# Patient Record
Sex: FEMALE | Race: WHITE | Employment: FULL TIME
[De-identification: names, ages, dates, MRNs, and addresses within clinical notes are randomized per-mention and may not be internally consistent; named-entity substitution may affect disease eponyms.]

---

## 2021-12-05 ENCOUNTER — NURSE TRIAGE (OUTPATIENT)
Dept: OTHER | Facility: CLINIC | Age: 26
End: 2021-12-05

## 2021-12-06 NOTE — TELEPHONE ENCOUNTER
Brief description of triage: Patient is calling because she developed abdominal pain on Thursday and the pain is 8/10. Patient has had nausea, vomiting, diarrhea and fever. Triage indicates for patient to go to ED    Care advice provided, patient verbalizes understanding; denies any other questions or concerns; instructed to call back for any new or worsening symptoms. This triage is a result of a call to [address] [place] [address]. Please do not respond to the triage nurse through this encounter. Any subsequent communication should be directly with the patient. Reason for Disposition   Pain is mainly in upper abdomen  (if needed ask: \"is it mainly above the belly button? \")   [1] SEVERE pain (e.g., excruciating) AND [2] present > 1 hour    Answer Assessment - Initial Assessment Questions  1. LOCATION: \"Where does it hurt? \"       Upper left abdomen. 2. RADIATION: \"Does the pain shoot anywhere else? \" (e.g., chest, back)      Radiates to middle of abdomen. 3. ONSET: \"When did the pain begin? \" (e.g., minutes, hours or days ago)       Thursday     4. SUDDEN: \"Gradual or sudden onset? \"      Gradual     5. PATTERN \"Does the pain come and go, or is it constant? \"     - If constant: \"Is it getting better, staying the same, or worsening? \"       (Note: Constant means the pain never goes away completely; most serious pain is constant and it progresses)      - If intermittent: \"How long does it last?\" \"Do you have pain now? \"      (Note: Intermittent means the pain goes away completely between bouts)      Comes and goes. 6. SEVERITY: \"How bad is the pain? \"  (e.g., Scale 1-10; mild, moderate, or severe)    - MILD (1-3): doesn't interfere with normal activities, abdomen soft and not tender to touch     - MODERATE (4-7): interferes with normal activities or awakens from sleep, tender to touch     - SEVERE (8-10): excruciating pain, doubled over, unable to do any normal activities       Current pain level is 9/10.     7. RECURRENT SYMPTOM: \"Have you ever had this type of abdominal pain before? \" If so, ask: \"When was the last time? \" and \"What happened that time? \"       Never had pain like this before. 8. CAUSE: \"What do you think is causing the abdominal pain? \"      Unsure of cause. 9. RELIEVING/AGGRAVATING FACTORS: \"What makes it better or worse? \" (e.g., movement, antacids, bowel movement)      Putting pressure makes pain worse. 10. OTHER SYMPTOMS: \"Has there been any vomiting, diarrhea, constipation, or urine problems? \"        Nausea, vomiting, fever and diarrhea.     Protocols used: ABDOMINAL PAIN - FEMALE-ADULT-, ABDOMINAL PAIN - UPPER-ADULT-AH

## 2022-03-12 ENCOUNTER — NURSE TRIAGE (OUTPATIENT)
Dept: OTHER | Facility: CLINIC | Age: 27
End: 2022-03-12

## 2022-03-12 NOTE — TELEPHONE ENCOUNTER
Subjective: Caller states \"Should I go to the ER?\"     Current Symptoms: Pt reports left upper abdominal pain and diarrhea. Eating and drinking makes the pain worse. Belching and laying in the fetal position makes the pain better. Pt states that she has had an episode of watery/yellow stool about every 30 minutes since last night. She did have pain like this in December 2021. Pain did go away on it's own. She does have appointment with a provider on Monday 3/14/2022. She is not sure if it is okay to wait until then to be seen. Onset: Last night    Associated Symptoms: reduced appetite, diarrhea    Pain Severity: 8-9 out of 10, off and on sharp pain, constant dull pain    Temperature: Denies fever    What has been tried: Tylenol     LMP: Last week Pregnant: No    Recommended disposition: Go to ED Now    Care advice provided, patient verbalizes understanding; denies any other questions or concerns; instructed to call back for any new or worsening symptoms. Patient/caller agrees to proceed to   Emergency Department    This triage is a result of a call to [address] [place] [address]. Please do not respond to the triage nurse through this encounter. Any subsequent communication should be directly with the patient.     Reason for Disposition   [1] SEVERE pain (e.g., excruciating) AND [2] present > 1 hour    Protocols used: ABDOMINAL PAIN - [facility]

## 2022-04-25 ENCOUNTER — TELEPHONE (OUTPATIENT)
Dept: SURGERY | Age: 27
End: 2022-04-25

## 2022-04-25 NOTE — TELEPHONE ENCOUNTER
LM for pt to call and schedule new patient appt for gallbladder consult. HIDA scan negative. Referral from Dr Alvina Carpenter to Dr Khai Kapoor, or [facility]. Please schedule new patient appt.

## 2022-04-29 ENCOUNTER — OFFICE VISIT (OUTPATIENT)
Dept: SURGERY | Age: 27
End: 2022-04-29
Payer: COMMERCIAL

## 2022-04-29 ENCOUNTER — TELEPHONE (OUTPATIENT)
Dept: SURGERY | Age: 27
End: 2022-04-29

## 2022-04-29 VITALS
RESPIRATION RATE: 18 BRPM | HEART RATE: 100 BPM | DIASTOLIC BLOOD PRESSURE: 70 MMHG | WEIGHT: 200.2 LBS | HEIGHT: 64 IN | OXYGEN SATURATION: 98 % | TEMPERATURE: 98.2 F | SYSTOLIC BLOOD PRESSURE: 116 MMHG | BODY MASS INDEX: 34.18 KG/M2

## 2022-04-29 DIAGNOSIS — K82.8 BILIARY DYSKINESIA: Primary | ICD-10-CM

## 2022-04-29 DIAGNOSIS — R10.11 RIGHT UPPER QUADRANT PAIN: ICD-10-CM

## 2022-04-29 PROCEDURE — G8417 CALC BMI ABV UP PARAM F/U: HCPCS | Performed by: SURGERY

## 2022-04-29 PROCEDURE — 1036F TOBACCO NON-USER: CPT | Performed by: SURGERY

## 2022-04-29 PROCEDURE — 99203 OFFICE O/P NEW LOW 30 MIN: CPT | Performed by: SURGERY

## 2022-04-29 PROCEDURE — G8427 DOCREV CUR MEDS BY ELIG CLIN: HCPCS | Performed by: SURGERY

## 2022-04-29 ASSESSMENT — ENCOUNTER SYMPTOMS
VOICE CHANGE: 0
PHOTOPHOBIA: 0
TROUBLE SWALLOWING: 0
COLOR CHANGE: 0
EYE REDNESS: 0
BLOOD IN STOOL: 0
CHOKING: 0
EYE ITCHING: 0
SHORTNESS OF BREATH: 0
CONSTIPATION: 0
WHEEZING: 0
VOMITING: 0
SINUS PRESSURE: 0
DIARRHEA: 0
CHEST TIGHTNESS: 0
EYE PAIN: 0
STRIDOR: 0
RHINORRHEA: 0
ANAL BLEEDING: 0
ABDOMINAL DISTENTION: 0
NAUSEA: 0
SINUS PAIN: 0
COUGH: 0
FACIAL SWELLING: 0
ABDOMINAL PAIN: 0
RECTAL PAIN: 0
APNEA: 0
EYE DISCHARGE: 0
BACK PAIN: 0
SORE THROAT: 0

## 2022-04-29 NOTE — PROGRESS NOTES
Subjective:      Patient ID: Fabiola Connolly is a 31 y.o. female. Chief Complaint   Patient presents with    Surgical Consult     New patient-referred by Dr Ramila Finney upper quadrant, nausea and vomiting     HPI  Pleasant 32 yo female who for the last 4 months has had severe right upper quadrant discomfort with associated nausea and vomiting. It is exacerbated by eating almost anything at this point. No alleviating factors. It is burning and does radiate to her back. She rates it as moderate to severe and interfering with daily life. History reviewed. No pertinent past medical history. History reviewed. No pertinent surgical history. Social History     Socioeconomic History    Marital status:      Spouse name: Not on file    Number of children: Not on file    Years of education: Not on file    Highest education level: Not on file   Occupational History    Not on file   Tobacco Use    Smoking status: Never Smoker    Smokeless tobacco: Never Used   Vaping Use    Vaping Use: Not on file   Substance and Sexual Activity    Alcohol use: Never    Drug use: Never    Sexual activity: Yes     Partners: Male   Other Topics Concern    Not on file   Social History Narrative    Not on file     Social Determinants of Health     Financial Resource Strain:     Difficulty of Paying Living Expenses: Not on file   Food Insecurity:     Worried About Running Out of Food in the Last Year: Not on file    Singh of Food in the Last Year: Not on file   Transportation Needs:     Lack of Transportation (Medical): Not on file    Lack of Transportation (Non-Medical):  Not on file   Physical Activity:     Days of Exercise per Week: Not on file    Minutes of Exercise per Session: Not on file   Stress:     Feeling of Stress : Not on file   Social Connections:     Frequency of Communication with Friends and Family: Not on file    Frequency of Social Gatherings with Friends and Family: Not on file    Attends Hinduism Services: Not on file    Active Member of Clubs or Organizations: Not on file    Attends Club or Organization Meetings: Not on file    Marital Status: Not on file   Intimate Partner Violence:     Fear of Current or Ex-Partner: Not on file    Emotionally Abused: Not on file    Physically Abused: Not on file    Sexually Abused: Not on file   Housing Stability:     Unable to Pay for Housing in the Last Year: Not on file    Number of [place] in the Last Year: Not on file    Unstable Housing in the Last Year: Not on file     Family History   Problem Relation Age of Onset    Fibromyalgia Mother     Diabetes Father     Liver Disease Father     Kidney Disease Paternal Grandfather        Review of Systems   Constitutional: Negative for activity change, appetite change, chills, diaphoresis, fatigue, fever and unexpected weight change. HENT: Negative for congestion, dental problem, drooling, ear discharge, ear pain, facial swelling, hearing loss, mouth sores, nosebleeds, postnasal drip, rhinorrhea, sinus pressure, sinus pain, sneezing, sore throat, tinnitus, trouble swallowing and voice change. Eyes: Negative for photophobia, pain, discharge, redness, itching and visual disturbance. Respiratory: Negative for apnea, cough, choking, chest tightness, shortness of breath, wheezing and stridor. Cardiovascular: Negative for chest pain, palpitations and leg swelling. Gastrointestinal: Negative for abdominal distention, abdominal pain, anal bleeding, blood in stool, constipation, diarrhea, nausea, rectal pain and vomiting. Endocrine: Negative for cold intolerance, heat intolerance, polydipsia, polyphagia and polyuria. Genitourinary: Negative for decreased urine volume, difficulty urinating, dyspareunia, dysuria, enuresis, flank pain, frequency, genital sores, hematuria, menstrual problem, pelvic pain, urgency, vaginal bleeding, vaginal discharge and vaginal pain.    Musculoskeletal: Negative for arthralgias, back pain, gait problem, joint swelling, myalgias, neck pain and neck stiffness. Skin: Negative for color change, pallor, rash and wound. Allergic/Immunologic: Negative for environmental allergies, food allergies and immunocompromised state. Neurological: Negative for dizziness, tremors, seizures, syncope, facial asymmetry, speech difficulty, weakness, light-headedness, numbness and headaches. Hematological: Negative for adenopathy. Does not bruise/bleed easily. Psychiatric/Behavioral: Negative for agitation, behavioral problems, confusion, decreased concentration, dysphoric mood, hallucinations, self-injury, sleep disturbance and suicidal ideas. The patient is not nervous/anxious and is not hyperactive. /70 (Site: Right Upper Arm, Position: Sitting, Cuff Size: Medium Adult)   Pulse 100   Temp 98.2 °F (36.8 °C) (Temporal)   Resp 18   Ht 5' 4\" (1.626 m)   Wt 200 lb 3.2 oz (90.8 kg)   LMP 04/27/2022 (Exact Date)   SpO2 98%   BMI 34.36 kg/m²         IMAGING    Objective:   Physical Exam  Constitutional:       Appearance: Normal appearance. She is well-developed. She is not ill-appearing. HENT:      Head: Normocephalic and atraumatic. Mouth/Throat:      Mouth: Mucous membranes are moist.      Pharynx: No oropharyngeal exudate. Eyes:      General: No scleral icterus. Extraocular Movements: Extraocular movements intact. Pupils: Pupils are equal, round, and reactive to light. Neck:      Thyroid: No thyromegaly. Trachea: No tracheal deviation. Cardiovascular:      Rate and Rhythm: Normal rate. Pulses: Normal pulses. Pulmonary:      Effort: Pulmonary effort is normal. No respiratory distress. Abdominal:      Palpations: Abdomen is soft. Tenderness: There is no abdominal tenderness. There is no guarding. Hernia: No hernia is present. Musculoskeletal:         General: No deformity. Normal range of motion.       Cervical back: Normal range of motion and neck supple. Skin:     General: Skin is warm. Findings: No rash. Neurological:      General: No focal deficit present. Mental Status: She is alert and oriented to person, place, and time. Psychiatric:         Mood and Affect: Mood normal.         Speech: Speech normal.         Behavior: Behavior normal.         Thought Content: Thought content normal.         Assessment:         32 yo female with symptoms classic for biliary disease, she had a HIDA scan demonstrating EF greater than 85%. 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 I discussed the surgery, the standard recovery and the risks and benefits and possible complications.  She understands all of this and would like to undergo laparoscopic cholecystectomy       Electronically signed by Mary Jo Reyes MD on 5/9/2022 at 11:28 AM

## 2022-05-03 ENCOUNTER — TELEPHONE (OUTPATIENT)
Dept: SURGERY | Age: 27
End: 2022-05-03

## 2022-05-22 LAB
ALBUMIN SERPL-MCNC: 4.6 G/DL (ref 3.2–5.3)
ALK PHOSPHATASE: 76 U/L (ref 39–130)
ALT SERPL-CCNC: 22 U/L (ref 0–31)
AST SERPL-CCNC: 13 U/L (ref 0–41)
BILIRUB SERPL-MCNC: 0.3 MG/DL (ref 0.3–1.2)
BILIRUBIN DIRECT: 0 MG/DL (ref 0–0.4)
TOTAL PROTEIN: 7.7 G/DL (ref 6–8)

## 2022-05-24 ENCOUNTER — PREP FOR PROCEDURE (OUTPATIENT)
Dept: SURGERY | Age: 27
End: 2022-05-24

## 2022-05-24 RX ORDER — INDOCYANINE GREEN AND WATER 25 MG
2.5 KIT INJECTION ONCE
Status: CANCELLED | OUTPATIENT
Start: 2022-05-24

## 2022-05-24 RX ORDER — SODIUM CHLORIDE 9 MG/ML
INJECTION, SOLUTION INTRAVENOUS CONTINUOUS
Status: CANCELLED | OUTPATIENT
Start: 2022-05-24

## 2022-06-01 ENCOUNTER — ANESTHESIA (OUTPATIENT)
Dept: OPERATING ROOM | Age: 27
End: 2022-06-01
Payer: COMMERCIAL

## 2022-06-01 ENCOUNTER — ANESTHESIA EVENT (OUTPATIENT)
Dept: OPERATING ROOM | Age: 27
End: 2022-06-01
Payer: COMMERCIAL

## 2022-06-01 ENCOUNTER — HOSPITAL ENCOUNTER (OUTPATIENT)
Age: 27
Setting detail: OUTPATIENT SURGERY
Discharge: HOME | End: 2022-06-01
Attending: SURGERY | Admitting: SURGERY
Payer: COMMERCIAL

## 2022-06-01 VITALS
TEMPERATURE: 97.7 F | HEART RATE: 97 BPM | OXYGEN SATURATION: 95 % | HEIGHT: 64 IN | DIASTOLIC BLOOD PRESSURE: 65 MMHG | BODY MASS INDEX: 34.93 KG/M2 | WEIGHT: 204.6 LBS | SYSTOLIC BLOOD PRESSURE: 106 MMHG | RESPIRATION RATE: 16 BRPM

## 2022-06-01 DIAGNOSIS — R10.11 RIGHT UPPER QUADRANT ABDOMINAL PAIN: ICD-10-CM

## 2022-06-01 DIAGNOSIS — K82.8 BILIARY DYSKINESIA: ICD-10-CM

## 2022-06-01 LAB — PREGNANCY, URINE: NEGATIVE

## 2022-06-01 PROCEDURE — 7100000000 HC PACU RECOVERY - FIRST 15 MIN: Performed by: SURGERY

## 2022-06-01 PROCEDURE — 7100000001 HC PACU RECOVERY - ADDTL 15 MIN: Performed by: SURGERY

## 2022-06-01 PROCEDURE — 2720000010 HC SURG SUPPLY STERILE: Performed by: SURGERY

## 2022-06-01 PROCEDURE — 3600000003 HC SURGERY LEVEL 3 BASE: Performed by: SURGERY

## 2022-06-01 PROCEDURE — 2580000003 HC RX 258

## 2022-06-01 PROCEDURE — 7100000010 HC PHASE II RECOVERY - FIRST 15 MIN: Performed by: SURGERY

## 2022-06-01 PROCEDURE — 2709999900 HC NON-CHARGEABLE SUPPLY: Performed by: SURGERY

## 2022-06-01 PROCEDURE — 88304 TISSUE EXAM BY PATHOLOGIST: CPT

## 2022-06-01 PROCEDURE — 2500000003 HC RX 250 WO HCPCS: Performed by: NURSE ANESTHETIST, CERTIFIED REGISTERED

## 2022-06-01 PROCEDURE — 3700000001 HC ADD 15 MINUTES (ANESTHESIA): Performed by: SURGERY

## 2022-06-01 PROCEDURE — 2500000003 HC RX 250 WO HCPCS: Performed by: SURGERY

## 2022-06-01 PROCEDURE — 6360000002 HC RX W HCPCS

## 2022-06-01 PROCEDURE — 7100000011 HC PHASE II RECOVERY - ADDTL 15 MIN: Performed by: SURGERY

## 2022-06-01 PROCEDURE — 2500000003 HC RX 250 WO HCPCS

## 2022-06-01 PROCEDURE — 6370000000 HC RX 637 (ALT 250 FOR IP)

## 2022-06-01 PROCEDURE — 47562 LAPAROSCOPIC CHOLECYSTECTOMY: CPT | Performed by: SURGERY

## 2022-06-01 PROCEDURE — 6360000002 HC RX W HCPCS: Performed by: ANESTHESIOLOGY

## 2022-06-01 PROCEDURE — 3700000000 HC ANESTHESIA ATTENDED CARE: Performed by: SURGERY

## 2022-06-01 PROCEDURE — 81025 URINE PREGNANCY TEST: CPT

## 2022-06-01 PROCEDURE — 3600000013 HC SURGERY LEVEL 3 ADDTL 15MIN: Performed by: SURGERY

## 2022-06-01 PROCEDURE — 6360000002 HC RX W HCPCS: Performed by: NURSE ANESTHETIST, CERTIFIED REGISTERED

## 2022-06-01 RX ORDER — MIDAZOLAM HYDROCHLORIDE 1 MG/ML
INJECTION INTRAMUSCULAR; INTRAVENOUS PRN
Status: DISCONTINUED | OUTPATIENT
Start: 2022-06-01 | End: 2022-06-01 | Stop reason: SDUPTHER

## 2022-06-01 RX ORDER — INDOCYANINE GREEN AND WATER 25 MG
2.5 KIT INJECTION ONCE
Status: COMPLETED | OUTPATIENT
Start: 2022-06-01 | End: 2022-06-01

## 2022-06-01 RX ORDER — MORPHINE SULFATE 2 MG/ML
2 INJECTION, SOLUTION INTRAMUSCULAR; INTRAVENOUS EVERY 5 MIN PRN
Status: DISCONTINUED | OUTPATIENT
Start: 2022-06-01 | End: 2022-06-01 | Stop reason: HOSPADM

## 2022-06-01 RX ORDER — HYDROCODONE BITARTRATE AND ACETAMINOPHEN 5; 325 MG/1; MG/1
1 TABLET ORAL ONCE
Status: DISCONTINUED | OUTPATIENT
Start: 2022-06-01 | End: 2022-06-01 | Stop reason: HOSPADM

## 2022-06-01 RX ORDER — SODIUM CHLORIDE 0.9 % (FLUSH) 0.9 %
5-40 SYRINGE (ML) INJECTION PRN
Status: DISCONTINUED | OUTPATIENT
Start: 2022-06-01 | End: 2022-06-01 | Stop reason: HOSPADM

## 2022-06-01 RX ORDER — HYDROCODONE BITARTRATE AND ACETAMINOPHEN 5; 325 MG/1; MG/1
1 TABLET ORAL EVERY 4 HOURS PRN
Qty: 18 TABLET | Refills: 0 | Status: SHIPPED | OUTPATIENT
Start: 2022-06-01 | End: 2022-06-04

## 2022-06-01 RX ORDER — LIDOCAINE HCL/PF 100 MG/5ML
SYRINGE (ML) INJECTION PRN
Status: DISCONTINUED | OUTPATIENT
Start: 2022-06-01 | End: 2022-06-01 | Stop reason: SDUPTHER

## 2022-06-01 RX ORDER — FENTANYL CITRATE 50 UG/ML
50 INJECTION, SOLUTION INTRAMUSCULAR; INTRAVENOUS EVERY 5 MIN PRN
Status: COMPLETED | OUTPATIENT
Start: 2022-06-01 | End: 2022-06-01

## 2022-06-01 RX ORDER — LABETALOL 20 MG/4 ML (5 MG/ML) INTRAVENOUS SYRINGE
10
Status: DISCONTINUED | OUTPATIENT
Start: 2022-06-01 | End: 2022-06-01 | Stop reason: HOSPADM

## 2022-06-01 RX ORDER — HYDROCODONE BITARTRATE AND ACETAMINOPHEN 5; 325 MG/1; MG/1
TABLET ORAL
Status: COMPLETED
Start: 2022-06-01 | End: 2022-06-01

## 2022-06-01 RX ORDER — FENTANYL CITRATE 50 UG/ML
INJECTION, SOLUTION INTRAMUSCULAR; INTRAVENOUS
Status: COMPLETED
Start: 2022-06-01 | End: 2022-06-01

## 2022-06-01 RX ORDER — BUPIVACAINE HYDROCHLORIDE 5 MG/ML
INJECTION, SOLUTION PERINEURAL PRN
Status: DISCONTINUED | OUTPATIENT
Start: 2022-06-01 | End: 2022-06-01 | Stop reason: ALTCHOICE

## 2022-06-01 RX ORDER — SODIUM CHLORIDE 0.9 % (FLUSH) 0.9 %
5-40 SYRINGE (ML) INJECTION EVERY 12 HOURS SCHEDULED
Status: DISCONTINUED | OUTPATIENT
Start: 2022-06-01 | End: 2022-06-01 | Stop reason: HOSPADM

## 2022-06-01 RX ORDER — ONDANSETRON 2 MG/ML
INJECTION INTRAMUSCULAR; INTRAVENOUS PRN
Status: DISCONTINUED | OUTPATIENT
Start: 2022-06-01 | End: 2022-06-01 | Stop reason: SDUPTHER

## 2022-06-01 RX ORDER — FENTANYL CITRATE 50 UG/ML
INJECTION, SOLUTION INTRAMUSCULAR; INTRAVENOUS PRN
Status: DISCONTINUED | OUTPATIENT
Start: 2022-06-01 | End: 2022-06-01 | Stop reason: SDUPTHER

## 2022-06-01 RX ORDER — PROPOFOL 10 MG/ML
INJECTION, EMULSION INTRAVENOUS PRN
Status: DISCONTINUED | OUTPATIENT
Start: 2022-06-01 | End: 2022-06-01 | Stop reason: SDUPTHER

## 2022-06-01 RX ORDER — SODIUM CHLORIDE 9 MG/ML
INJECTION, SOLUTION INTRAVENOUS PRN
Status: DISCONTINUED | OUTPATIENT
Start: 2022-06-01 | End: 2022-06-01 | Stop reason: HOSPADM

## 2022-06-01 RX ORDER — SODIUM CHLORIDE 9 MG/ML
INJECTION, SOLUTION INTRAVENOUS CONTINUOUS
Status: DISCONTINUED | OUTPATIENT
Start: 2022-06-01 | End: 2022-06-01 | Stop reason: HOSPADM

## 2022-06-01 RX ORDER — ROCURONIUM BROMIDE 10 MG/ML
INJECTION, SOLUTION INTRAVENOUS PRN
Status: DISCONTINUED | OUTPATIENT
Start: 2022-06-01 | End: 2022-06-01 | Stop reason: SDUPTHER

## 2022-06-01 RX ORDER — DEXAMETHASONE SODIUM PHOSPHATE 10 MG/ML
INJECTION, EMULSION INTRAMUSCULAR; INTRAVENOUS PRN
Status: DISCONTINUED | OUTPATIENT
Start: 2022-06-01 | End: 2022-06-01 | Stop reason: SDUPTHER

## 2022-06-01 RX ADMIN — ROCURONIUM BROMIDE 50 MG: 50 INJECTION, SOLUTION INTRAVENOUS at 09:56

## 2022-06-01 RX ADMIN — DEXAMETHASONE SODIUM PHOSPHATE 10 MG: 10 INJECTION, EMULSION INTRAMUSCULAR; INTRAVENOUS at 10:05

## 2022-06-01 RX ADMIN — MORPHINE SULFATE 2 MG: 2 INJECTION, SOLUTION INTRAMUSCULAR; INTRAVENOUS at 12:57

## 2022-06-01 RX ADMIN — FENTANYL CITRATE 50 MCG: 50 INJECTION INTRAMUSCULAR; INTRAVENOUS at 10:46

## 2022-06-01 RX ADMIN — FENTANYL CITRATE 100 MCG: 50 INJECTION INTRAMUSCULAR; INTRAVENOUS at 10:05

## 2022-06-01 RX ADMIN — FENTANYL CITRATE 100 MCG: 50 INJECTION INTRAMUSCULAR; INTRAVENOUS at 09:50

## 2022-06-01 RX ADMIN — FENTANYL CITRATE 50 MCG: 50 INJECTION, SOLUTION INTRAMUSCULAR; INTRAVENOUS at 11:07

## 2022-06-01 RX ADMIN — FENTANYL CITRATE 50 MCG: 50 INJECTION, SOLUTION INTRAMUSCULAR; INTRAVENOUS at 11:01

## 2022-06-01 RX ADMIN — Medication 80 MG: at 09:55

## 2022-06-01 RX ADMIN — SODIUM CHLORIDE: 9 INJECTION, SOLUTION INTRAVENOUS at 08:20

## 2022-06-01 RX ADMIN — SUGAMMADEX 200 MG: 100 INJECTION, SOLUTION INTRAVENOUS at 10:41

## 2022-06-01 RX ADMIN — Medication 2.5 MG: at 08:08

## 2022-06-01 RX ADMIN — SODIUM CHLORIDE: 9 INJECTION, SOLUTION INTRAVENOUS at 10:38

## 2022-06-01 RX ADMIN — ONDANSETRON 4 MG: 2 INJECTION INTRAMUSCULAR; INTRAVENOUS at 10:32

## 2022-06-01 RX ADMIN — MIDAZOLAM 2 MG: 1 INJECTION INTRAMUSCULAR; INTRAVENOUS at 09:50

## 2022-06-01 RX ADMIN — CEFOXITIN 2000 MG: 2 INJECTION, POWDER, FOR SOLUTION INTRAVENOUS at 10:04

## 2022-06-01 RX ADMIN — SODIUM CHLORIDE: 9 INJECTION, SOLUTION INTRAVENOUS at 08:08

## 2022-06-01 RX ADMIN — HYDROCODONE BITARTRATE AND ACETAMINOPHEN 1 TABLET: 5; 325 TABLET ORAL at 11:44

## 2022-06-01 RX ADMIN — PROPOFOL 200 MG: 10 INJECTION, EMULSION INTRAVENOUS at 09:56

## 2022-06-01 ASSESSMENT — ENCOUNTER SYMPTOMS
ABDOMINAL PAIN: 0
DIARRHEA: 0
RHINORRHEA: 0
COLOR CHANGE: 0
BLOOD IN STOOL: 0
FACIAL SWELLING: 0
APNEA: 0
BACK PAIN: 0
SINUS PAIN: 0
TROUBLE SWALLOWING: 0
SHORTNESS OF BREATH: 0
ANAL BLEEDING: 0
EYE ITCHING: 0
ABDOMINAL DISTENTION: 0
COUGH: 0
VOMITING: 0
CHEST TIGHTNESS: 0
CONSTIPATION: 0
EYE PAIN: 0
SINUS PRESSURE: 0
NAUSEA: 0
RECTAL PAIN: 0
WHEEZING: 0
VOICE CHANGE: 0
CHOKING: 0
STRIDOR: 0
PHOTOPHOBIA: 0
EYE REDNESS: 0
SORE THROAT: 0
EYE DISCHARGE: 0

## 2022-06-01 ASSESSMENT — PAIN SCALES - GENERAL
PAINLEVEL_OUTOF10: 3
PAINLEVEL_OUTOF10: 4
PAINLEVEL_OUTOF10: 4
PAINLEVEL_OUTOF10: 7
PAINLEVEL_OUTOF10: 7
PAINLEVEL_OUTOF10: 4

## 2022-06-01 ASSESSMENT — PAIN DESCRIPTION - PAIN TYPE
TYPE: SURGICAL PAIN
TYPE: SURGICAL PAIN

## 2022-06-01 ASSESSMENT — PAIN - FUNCTIONAL ASSESSMENT: PAIN_FUNCTIONAL_ASSESSMENT: 0-10

## 2022-06-01 ASSESSMENT — PAIN DESCRIPTION - LOCATION
LOCATION: ABDOMEN

## 2022-06-01 NOTE — H&P
Subjective:      Patient ID: Lacy Navarro is a 31 y.o. female. No chief complaint on file. HPI  Pleasant 30 yo female who for the last 4 months has had severe right upper quadrant discomfort with associated nausea and vomiting. It is exacerbated by eating almost anything at this point. No alleviating factors. It is burning and does radiate to her back. She rates it as moderate to severe and interfering with daily life. History reviewed. No pertinent past medical history. Past Surgical History:   Procedure Laterality Date    LYMPH NODE BIOPSY       Social History     Socioeconomic History    Marital status:      Spouse name: Not on file    Number of children: Not on file    Years of education: Not on file    Highest education level: Not on file   Occupational History    Not on file   Tobacco Use    Smoking status: Never Smoker    Smokeless tobacco: Never Used   Vaping Use    Vaping Use: Not on file   Substance and Sexual Activity    Alcohol use: Yes     Comment: OCCASIONALLY    Drug use: Never    Sexual activity: Yes     Partners: Male   Other Topics Concern    Not on file   Social History Narrative    Not on file     Social Determinants of Health     Financial Resource Strain:     Difficulty of Paying Living Expenses: Not on file   Food Insecurity:     Worried About Running Out of Food in the Last Year: Not on file    Singh of Food in the Last Year: Not on file   Transportation Needs:     Lack of Transportation (Medical): Not on file    Lack of Transportation (Non-Medical):  Not on file   Physical Activity:     Days of Exercise per Week: Not on file    Minutes of Exercise per Session: Not on file   Stress:     Feeling of Stress : Not on file   Social Connections:     Frequency of Communication with Friends and Family: Not on file    Frequency of Social Gatherings with Friends and Family: Not on file    Attends Temple Services: Not on file   [employer] of Clubs or Organizations: Not on file    Attends Club or Organization Meetings: Not on file    Marital Status: Not on file   Intimate Partner Violence:     Fear of Current or Ex-Partner: Not on file    Emotionally Abused: Not on file    Physically Abused: Not on file    Sexually Abused: Not on file   Housing Stability:     Unable to Pay for Housing in the Last Year: Not on file    Number of [place] in the Last Year: Not on file    Unstable Housing in the Last Year: Not on file     Family History   Problem Relation Age of Onset    Fibromyalgia Mother     Diabetes Father     Liver Disease Father     Kidney Disease Paternal Grandfather        Review of Systems   Constitutional: Negative for activity change, appetite change, chills, diaphoresis, fatigue, fever and unexpected weight change. HENT: Negative for congestion, dental problem, drooling, ear discharge, ear pain, facial swelling, hearing loss, mouth sores, nosebleeds, postnasal drip, rhinorrhea, sinus pressure, sinus pain, sneezing, sore throat, tinnitus, trouble swallowing and voice change. Eyes: Negative for photophobia, pain, discharge, redness, itching and visual disturbance. Respiratory: Negative for apnea, cough, choking, chest tightness, shortness of breath, wheezing and stridor. Cardiovascular: Negative for chest pain, palpitations and leg swelling. Gastrointestinal: Negative for abdominal distention, abdominal pain, anal bleeding, blood in stool, constipation, diarrhea, nausea, rectal pain and vomiting. Endocrine: Negative for cold intolerance, heat intolerance, polydipsia, polyphagia and polyuria. Genitourinary: Negative for decreased urine volume, difficulty urinating, dyspareunia, dysuria, enuresis, flank pain, frequency, genital sores, hematuria, menstrual problem, pelvic pain, urgency, vaginal bleeding, vaginal discharge and vaginal pain.    Musculoskeletal: Negative for arthralgias, back pain, gait problem, joint swelling, myalgias, neck pain and neck stiffness. Skin: Negative for color change, pallor, rash and wound. Allergic/Immunologic: Negative for environmental allergies, food allergies and immunocompromised state. Neurological: Negative for dizziness, tremors, seizures, syncope, facial asymmetry, speech difficulty, weakness, light-headedness, numbness and headaches. Hematological: Negative for adenopathy. Does not bruise/bleed easily. Psychiatric/Behavioral: Negative for agitation, behavioral problems, confusion, decreased concentration, dysphoric mood, hallucinations, self-injury, sleep disturbance and suicidal ideas. The patient is not nervous/anxious and is not hyperactive. /74   Pulse (!) 101   Temp 98.2 °F (36.8 °C) (Tympanic)   Resp 20   Ht 5' 4\" (1.626 m)   Wt 204 lb 9.6 oz (92.8 kg)   SpO2 98%   BMI 35.12 kg/m²         IMAGING    Objective:   Physical Exam  Constitutional:       Appearance: Normal appearance. She is well-developed. She is not ill-appearing. HENT:      Head: Normocephalic and atraumatic. Mouth/Throat:      Mouth: Mucous membranes are moist.      Pharynx: No oropharyngeal exudate. Eyes:      General: No scleral icterus. Extraocular Movements: Extraocular movements intact. Pupils: Pupils are equal, round, and reactive to light. Neck:      Thyroid: No thyromegaly. Trachea: No tracheal deviation. Cardiovascular:      Rate and Rhythm: Normal rate. Pulses: Normal pulses. Pulmonary:      Effort: Pulmonary effort is normal. No respiratory distress. Abdominal:      Palpations: Abdomen is soft. Tenderness: There is no abdominal tenderness. There is no guarding. Hernia: No hernia is present. Musculoskeletal:         General: No deformity. Normal range of motion. Cervical back: Normal range of motion and neck supple. Skin:     General: Skin is warm. Findings: No rash. Neurological:      General: No focal deficit present. Mental Status: She is alert and oriented to person, place, and time. Psychiatric:         Mood and Affect: Mood normal.         Speech: Speech normal.         Behavior: Behavior normal.         Thought Content: Thought content normal.         Assessment:         32 yo female with symptoms classic for biliary disease, she had a HIDA scan demonstrating EF greater than 85%. 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 I discussed the surgery, the standard recovery and the risks and benefits and possible complications.  She understands all of this and would like to undergo laparoscopic cholecystectomy       Electronically signed by Nathan Duarte MD on 6/1/2022 at 8:56 AM

## 2022-06-01 NOTE — PROGRESS NOTES
Pt admitted to [facility] room 14 and oriented to unit. SCD sleeves applied. Nares swabbed. Pt verbalized permission for first name, last initial and physicians name on white board. SDS board and discharge criteria explained, pt and family verbalized understanding. Pt denies thoughts of harming self or others. Call light in reach. Family at the bedside.

## 2022-06-01 NOTE — OP NOTE
Ruy Shannon 59  RECORD OF OPERATION  PATIENT NAME: Ashanti Williamson  MEDICAL RECORD NO. [id number]  SURGEON: Bettye Zamudio MD  Primary Care Physician: Tomás Rogers II, DO     PROCEDURE PERFORMED:  6/1/2022  PREOPERATIVE DIAGNOSIS: Biliary dyskinesia [K82.8]  Right upper quadrant abdominal pain [R10.11]  POSTOPERATIVE DIAGNOSIS: Same, path pending  PROCEDURE PERFORMED:  Laparoscopic cholecystectomy. SURGEON:  Dr. Yoel Hunter:  General with local.    DISCUSSION: Steffi Seen is a 31y.o. year old female who was seen in evaluation at request of [facility] And DO Sanjiv regarding signs and symptoms, gallbladder disease, radiographic findings of hyperkinetic cholecystitis. . After history and physical examination was performed treatment options were discussed with the patient. The risks and benefits of surgery were explained to the patient and all questions were answered. The patient elected to proceed with laparoscopic possible open cholecystectomy and informed consent was obtained. OPERATIVE FINDINGS:    - thin walled gallbaldder  - critical view of safety was achieved demonstrating two and only two distinct structures entering the gallbladder and the cystic plate was cleared. PROCEDURE:  The patient was seen in the preoperative holding area where informed consent was reviewed. They were taken to the operating room, placed on the operating table in supine position. After induction of adequate anesthesia a formal timeout was performed and they were prepped and draped in the normal sterile fashion. Local anesthetic was instilled at Vill[place]'s point and a skin incision was made with an 11 blade scalpel. A Veress needle was inserted, there was good return of air and good fluid drop. Pneumoperitoneum was then established to a pressure of 15mm of Mercury. A 5mm Opti view trocar was then used to enter the abdomen infraumbilically. Initial laparoscopy demonstrated no unexpected findings.  Under direct visualization the remaining operative trocars were placed so that there was a 5 mm infraumbilical trocar, a 5 mm right anterior axillary line trocar, 5mm subcostal trocar at the mid clavicular line,  and a 12 mm trocar in the subxiphoid location. The fundus of the gallbladder was then grasped and retracted superiorly to the right. Pericholecystic adhesions were lysed using blunt dissection. The cystic duct and artery were then circumferentially dissected out until a critical view of safety was achieved that demonstrated two and only two distinct structures entering the gallbladder and they cystic plate was cleared. Cystic duct and artery were doubly clipped and ligated. The gallbladder was then removed from the gallbladder fossa with the aid of electrocautery. The specimen was removed from the abdomen with through the 12 mm site in a specimen bag. The gallbladder fossa was reinspected. Adequate hemostasis was appreciated. Clips noted to be in proper position and there was no evidence of bile leak was evident. All ports and instruments removed from the patient's abdomen. Pneumoperitoneum  was evacuated. The 12 mm fascial defect was approximated using 0 Vicryl suture. Skin edges was infiltrated with local anesthetic. The skin incisions were closed with subcuticular interrupted Monocryl sutures and surgical glue was applied. Patient awoken from  anesthesia, transferred to PACU in stable and satisfactory condition. No immediate complications evident. All sponge, instrument and needle counts were correct at the completion of procedure. ESTIMATED BLOOD LOSS:  5 ml    SPECIMEN:  Gallbladder sent to pathology for analysis. COMPLICATIONS:  None immediately appreciated.     Electronically signed by Giovani Corrales MD on 6/1/2022 at 10:56 AM

## 2022-06-01 NOTE — PROGRESS NOTES
1053: Pt arrives to pacu, awakens to verbal stimuli. Pt tearful on arrival. On simple mask 8L, respirations easy and unlabored. VSS  1101: Pt c/o pain 7/10, 50mcg of fentanyl administered  1107: No change, 50mcg of fentanyl given  1115: Pt states pain 2-3/10 and tolerable. VSS  1127: Pt meets criteria for discharge from pacu, transported to [facility] in stable condition.  VSS

## 2022-06-01 NOTE — ANESTHESIA PRE PROCEDURE
Department of Anesthesiology  Preprocedure Note       Name:  Serge Dumont   Age:  31 y.o.  :  1995                                          MRN:  [id number]         Date:  2022      Surgeon: Argentina Martinezor): Cyndy De La Vega MD    Procedure: Procedure(s):  Laparoscopic Cholecystectomy possible Open    Medications prior to admission:   Prior to Admission medications    Medication Sig Start Date End Date Taking? Authorizing Provider   Prenatal MV-Min-Fe Fum-FA-DHA (PRENATAL 1 PO) Take by mouth daily    Historical Provider, MD       Current medications:    Current Facility-Administered Medications   Medication Dose Route Frequency Provider Last Rate Last Admin    0.9 % sodium chloride infusion   IntraVENous Continuous Crystal Camper,  mL/hr at 79/97/45 0808 New Bag at 22 0808    cefOXitin (MEFOXIN) 2000 mg in dextrose 5% 50 mL (mini-bag)  2,000 mg IntraVENous Once [employer], LPN           Allergies: Allergies   Allergen Reactions    Dextromethorphan Other (See Comments)     Trouble sleeping       Problem List:  There is no problem list on file for this patient. Past Medical History:  History reviewed. No pertinent past medical history.     Past Surgical History:        Procedure Laterality Date    LYMPH NODE BIOPSY         Social History:    Social History     Tobacco Use    Smoking status: Never Smoker    Smokeless tobacco: Never Used   Substance Use Topics    Alcohol use: Yes     Comment: OCCASIONALLY                                Counseling given: Not Answered      Vital Signs (Current):   Vitals:    22 0741   BP: 131/74   Pulse: (!) 101   Resp: 20   Temp: 98.2 °F (36.8 °C)   TempSrc: Tympanic   SpO2: 98%   Weight: 204 lb 9.6 oz (92.8 kg)   Height: 5' 4\" (1.626 m)                                              BP Readings from Last 3 Encounters:   22 131/74   22 116/70       NPO Status: Time of last liquid consumption:                         Time of last

## 2022-06-01 NOTE — ANESTHESIA POSTPROCEDURE EVALUATION
Department of Anesthesiology  Postprocedure Note    Patient: Katy Parra  MRN: [id number]  YOB: 1995  Date of evaluation: 6/1/2022  Time:  1:53 PM     Procedure Summary     Date: 06/01/22 Room / Location: [facility] 08 / [facility]    Anesthesia Start: 1468 Anesthesia Stop: 6685    Procedure: Laparoscopic Cholecystectomy (N/A ) Diagnosis:       Biliary dyskinesia      Right upper quadrant abdominal pain      (Biliary dyskinesia [K82.8])      (Right upper quadrant abdominal pain [R10.11])    Surgeons: Tu Plummer MD Responsible Provider: Samaria Santos MD    Anesthesia Type: general ASA Status: 2          Anesthesia Type: No value filed. Isac Phase I: Isac Score: 10    Isac Phase II: Isac Score: 10    Last vitals: Reviewed and per EMR flowsheets.        Anesthesia Post Evaluation    Patient location during evaluation: PACU  Patient participation: complete - patient participated  Level of consciousness: awake  Airway patency: patent  Nausea & Vomiting: no vomiting and no nausea  Complications: no  Cardiovascular status: hemodynamically stable  Respiratory status: acceptable  Hydration status: stable

## 2022-06-01 NOTE — PROGRESS NOTES

## 2022-06-02 ENCOUNTER — TELEPHONE (OUTPATIENT)
Dept: SURGERY | Age: 27
End: 2022-06-02

## 2022-06-02 NOTE — TELEPHONE ENCOUNTER
Spoke with Clotilde Velasquez and stated she is doing well, but having soreness around her abdomen. Took a pain pill at 4am and will be switching to just Ibuprofen for pain. Has issues with constipation and does not want to keep taking the pain pills. Started back on Miralax last evening. Post op appt on 6/28/2022.

## 2022-06-28 ENCOUNTER — OFFICE VISIT (OUTPATIENT)
Dept: SURGERY | Age: 27
End: 2022-06-28

## 2022-06-28 VITALS
HEIGHT: 64 IN | RESPIRATION RATE: 16 BRPM | SYSTOLIC BLOOD PRESSURE: 102 MMHG | TEMPERATURE: 97.7 F | HEART RATE: 136 BPM | WEIGHT: 206.2 LBS | BODY MASS INDEX: 35.2 KG/M2 | OXYGEN SATURATION: 97 % | DIASTOLIC BLOOD PRESSURE: 64 MMHG

## 2022-06-28 DIAGNOSIS — Z09 POSTOPERATIVE EXAMINATION: Primary | ICD-10-CM

## 2022-06-28 PROCEDURE — 99024 POSTOP FOLLOW-UP VISIT: CPT | Performed by: SURGERY

## 2022-06-28 NOTE — PROGRESS NOTES
MD Asia Garcia 238 Post op Note    Pt Name: Radu Martel Record Number: [id number]  Date of Birth 1995   Today's Date: 7/8/2022    ASSESSMENT       ICD-10-CM    1. Postoperative examination  Z09         PLAN   1. Patient making a good recovery no signs of postoperative complication, reviewed the importance of no heavy lifting or strenuous activity for a total of 6 weeks to prevent recurrence. Patient expressed understanding  Adilene Pérez is doing well, had some intermittent diarrhea that has subsequently resolved without intervention. Denies any fevers chills nausea or vomiting. .          CURRENT MEDICATIONS     Current Outpatient Medications on File Prior to Visit   Medication Sig Dispense Refill    Prenatal MV-Min-Fe Fum-FA-DHA (PRENATAL 1 PO) Take by mouth daily       No current facility-administered medications on file prior to visit. OBJECTIVE   CURRENT VITALS:  height is 5' 4\" (1.626 m) and weight is 206 lb 3.2 oz (93.5 kg). Her temporal temperature is 97.7 °F (36.5 °C). Her blood pressure is 102/64 and her pulse is 136 (abnormal). Her respiration is 16 and oxygen saturation is 97%. Physical Exam  Vitals reviewed. Constitutional:       Appearance: Normal appearance. She is not ill-appearing. HENT:      Head: Normocephalic and atraumatic. Cardiovascular:      Rate and Rhythm: Normal rate. Pulmonary:      Effort: Pulmonary effort is normal. No respiratory distress. Abdominal:      General: There is no distension. Palpations: Abdomen is soft. Comments: Incisions clean dry and intact, no erythema or induration. No drainage   Neurological:      Mental Status: She is alert. Psychiatric:         Mood and Affect: Mood normal.         Behavior: Behavior normal.          LABS and Pathology   FINAL DIAGNOSIS:   Gallbladder, cholecystectomy:             Minimal chronic cholecystitis.      RADIOLOGY na    Electronically signed by Erika Medina MD on 7/8/2022 at 2:53 PM

## 2022-12-23 ENCOUNTER — NURSE TRIAGE (OUTPATIENT)
Dept: OTHER | Facility: CLINIC | Age: 27
End: 2022-12-23

## 2022-12-23 NOTE — TELEPHONE ENCOUNTER
Location of patient: [place]    Subjective: Caller states \"Nose bleed\"     Current Symptoms: Nose bleed X2 episodes;  Pregnant; Low dose ASA; Denies injury;  Bleeding is minimal and stops quickly; Onset: 2 days ago; sudden    Associated Symptoms: NA    Pain Severity: 0/10; Temperature: denies     What has been tried: pressure on nose;    LMP:  August 2022  Pregnant: Yes    Recommended disposition: Bartolo Jha 4136 advice provided, patient verbalizes understanding; denies any other questions or concerns; instructed to call back for any new or worsening symptoms. Patient/caller agrees to follow-up with PCP /OB if persists or worsens; This triage is a result of a call to [address] [place] [address] of [employer]. Please do not respond to the triage nurse through this encounter. Any subsequent communication should be directly with the patient.     Reason for Disposition   [1] Nosebleed AND [2] bleeding present < 30 minutes AND [3] using correct technique per guideline    Protocols used: Nosebleed-ADULT-

## (undated) DEVICE — PUMP SUC IRR TBNG L10FT W/ HNDPC ASSEMB STRYKEFLOW 2

## (undated) DEVICE — TROCAR: Brand: KII FIOS FIRST ENTRY

## (undated) DEVICE — Z DUPLICATE USE 2431315 SET INSUF TBNG HI FLO W/ SMK EVAC FOR PNEUMOCLEAR

## (undated) DEVICE — GENERAL LAPAROSCOPY PACK-LF: Brand: MEDLINE INDUSTRIES, INC.

## (undated) DEVICE — UNIVERSAL MONOPOLAR LAPAROSCOPIC CABLE 10FT, 4MM PIN CONNECTOR: Brand: CONMED

## (undated) DEVICE — BAG SPEC REM 224ML W4XL6IN DIA10MM 1 HND GYN DISP ENDOPCH

## (undated) DEVICE — SUTURE VCRL + SZ 0 L27IN ABSRB VLT L26MM UR-6 5/8 CIR VCP603H

## (undated) DEVICE — SOLUTION ANTIFOG VIS SYS CLEARIFY LAPSCP

## (undated) DEVICE — GLOVE ORANGE PI 7   MSG9070

## (undated) DEVICE — TROCAR: Brand: KII® SLEEVE

## (undated) DEVICE — INSUFFLATION NEEDLE TO ESTABLISH PNEUMOPERITONEUM.: Brand: INSUFFLATION NEEDLE

## (undated) DEVICE — APPLIER CLP M/L SHFT DIA5MM 15 LIG LIGAMAX 5

## (undated) DEVICE — ADHESIVE SKIN CLSR 0.7ML TOP DERMBND ADV